# Patient Record
Sex: MALE | NOT HISPANIC OR LATINO
[De-identification: names, ages, dates, MRNs, and addresses within clinical notes are randomized per-mention and may not be internally consistent; named-entity substitution may affect disease eponyms.]

---

## 2018-11-16 PROBLEM — Z00.00 ENCOUNTER FOR PREVENTIVE HEALTH EXAMINATION: Status: ACTIVE | Noted: 2018-11-16

## 2018-11-26 ENCOUNTER — APPOINTMENT (OUTPATIENT)
Dept: SURGERY | Facility: CLINIC | Age: 58
End: 2018-11-26
Payer: MEDICARE

## 2018-11-26 VITALS
SYSTOLIC BLOOD PRESSURE: 123 MMHG | BODY MASS INDEX: 27.3 KG/M2 | DIASTOLIC BLOOD PRESSURE: 84 MMHG | OXYGEN SATURATION: 94 % | WEIGHT: 195 LBS | HEIGHT: 71 IN | RESPIRATION RATE: 16 BRPM | HEART RATE: 64 BPM

## 2018-11-26 PROCEDURE — 99202 OFFICE O/P NEW SF 15 MIN: CPT

## 2018-12-27 ENCOUNTER — APPOINTMENT (OUTPATIENT)
Dept: MRI IMAGING | Facility: CLINIC | Age: 58
End: 2018-12-27

## 2019-01-23 ENCOUNTER — APPOINTMENT (OUTPATIENT)
Dept: MRI IMAGING | Facility: CLINIC | Age: 59
End: 2019-01-23
Payer: MEDICARE

## 2019-01-23 ENCOUNTER — OUTPATIENT (OUTPATIENT)
Dept: OUTPATIENT SERVICES | Facility: HOSPITAL | Age: 59
LOS: 1 days | End: 2019-01-23
Payer: MEDICARE

## 2019-01-23 DIAGNOSIS — Z00.8 ENCOUNTER FOR OTHER GENERAL EXAMINATION: ICD-10-CM

## 2019-01-23 PROCEDURE — 72197 MRI PELVIS W/O & W/DYE: CPT

## 2019-01-23 PROCEDURE — 72197 MRI PELVIS W/O & W/DYE: CPT | Mod: 26

## 2019-01-23 PROCEDURE — A9585: CPT

## 2019-01-29 ENCOUNTER — APPOINTMENT (OUTPATIENT)
Dept: SURGERY | Facility: CLINIC | Age: 59
End: 2019-01-29
Payer: MEDICARE

## 2019-01-29 VITALS
WEIGHT: 200 LBS | BODY MASS INDEX: 28 KG/M2 | HEART RATE: 60 BPM | HEIGHT: 71 IN | DIASTOLIC BLOOD PRESSURE: 83 MMHG | SYSTOLIC BLOOD PRESSURE: 129 MMHG | OXYGEN SATURATION: 96 %

## 2019-01-29 DIAGNOSIS — R10.30 LOWER ABDOMINAL PAIN, UNSPECIFIED: ICD-10-CM

## 2019-01-29 PROCEDURE — 99212 OFFICE O/P EST SF 10 MIN: CPT

## 2019-01-29 NOTE — HISTORY OF PRESENT ILLNESS
[de-identified] : Pt seen and examined. pt had MRI of the groin due to chronic pain but it did not reveal any gross pathology. On exam, he has pain in the distribution of illioinguinal nerve.  long discussion was carried out with regard to various treatment plans. I advised the patient to go back to his pain doctor to try the nerve ablation. the option of illioinguinal neurectomy was also discussed but as last ditch effort. He expressed full understanding and is agreeable with the plan.

## 2019-01-29 NOTE — CONSULT LETTER
[Consult Letter:] : I had the pleasure of evaluating your patient, [unfilled]. [Consult Closing:] : Thank you very much for allowing me to participate in the care of this patient.  If you have any questions, please do not hesitate to contact me. [FreeTextEntry1] : Pt seen and examined. pt had MRI of the groin due to chronic pain but it did not reveal any gross pathology. On exam, he has pain in the distribution of illioinguinal nerve.  long discussion was carried out with regard to various treatment plans. I advised the patient to go back to his pain doctor to try the nerve ablation. the option of illioinguinal neurectomy was also discussed but as last ditch effort. He expressed full understanding and is agreeable with the plan.   [FreeTextEntry3] : Sincerely, \par \par \par Ibrahima Heaton MD, FACS\par  of Surgery\par [facility]\par \par [employer] [facility]\par Associate \par General Surgery Residency\par [employer] [facility]\par

## 2019-02-12 ENCOUNTER — TRANSCRIPTION ENCOUNTER (OUTPATIENT)
Age: 59
End: 2019-02-12

## 2019-08-07 ENCOUNTER — TRANSCRIPTION ENCOUNTER (OUTPATIENT)
Age: 59
End: 2019-08-07